# Patient Record
Sex: FEMALE | Race: WHITE | NOT HISPANIC OR LATINO | URBAN - METROPOLITAN AREA
[De-identification: names, ages, dates, MRNs, and addresses within clinical notes are randomized per-mention and may not be internally consistent; named-entity substitution may affect disease eponyms.]

---

## 2020-03-09 ENCOUNTER — OFFICE VISIT (OUTPATIENT)
Dept: ORTHOPEDICS | Facility: CLINIC | Age: 40
End: 2020-03-09
Payer: MEDICAID

## 2020-03-09 VITALS — TEMPERATURE: 98 F | HEIGHT: 62 IN | WEIGHT: 229.31 LBS | BODY MASS INDEX: 42.2 KG/M2

## 2020-03-09 DIAGNOSIS — M17.11 PRIMARY OSTEOARTHRITIS OF RIGHT KNEE: Primary | ICD-10-CM

## 2020-03-09 PROCEDURE — 20610 DRAIN/INJ JOINT/BURSA W/O US: CPT | Mod: RT,S$GLB,, | Performed by: ORTHOPAEDIC SURGERY

## 2020-03-09 PROCEDURE — 99203 PR OFFICE/OUTPT VISIT, NEW, LEVL III, 30-44 MIN: ICD-10-PCS | Mod: 25,S$GLB,, | Performed by: ORTHOPAEDIC SURGERY

## 2020-03-09 PROCEDURE — 99203 OFFICE O/P NEW LOW 30 MIN: CPT | Mod: 25,S$GLB,, | Performed by: ORTHOPAEDIC SURGERY

## 2020-03-09 PROCEDURE — 20610 LARGE JOINT ASPIRATION/INJECTION: R KNEE: ICD-10-PCS | Mod: RT,S$GLB,, | Performed by: ORTHOPAEDIC SURGERY

## 2020-03-09 RX ORDER — TRIAMCINOLONE ACETONIDE 40 MG/ML
40 INJECTION, SUSPENSION INTRA-ARTICULAR; INTRAMUSCULAR
Status: DISCONTINUED | OUTPATIENT
Start: 2020-03-09 | End: 2020-03-09 | Stop reason: HOSPADM

## 2020-03-09 RX ADMIN — TRIAMCINOLONE ACETONIDE 40 MG: 40 INJECTION, SUSPENSION INTRA-ARTICULAR; INTRAMUSCULAR at 08:03

## 2020-03-09 NOTE — PROGRESS NOTES
Subjective:      Patient ID: Giovana Hammonds is a 39 y.o. female.    Chief Complaint: Pain of the Right Knee    HPI 39-year-old lady with a several month history of right knee pain. It was initially noted that she had a valgus deformity of the knee after weight loss surgery  Review of Systems   Constitution: Negative for fever and weight loss.   Cardiovascular: Negative for chest pain and leg swelling.   Musculoskeletal: Positive for arthritis, joint pain and stiffness. Negative for joint swelling.   Gastrointestinal: Negative for change in bowel habit.   Genitourinary: Negative for bladder incontinence and hematuria.   Neurological: Negative for focal weakness, numbness, paresthesias and sensory change.         Objective:      Examination of the right knee shows valgus alignment. She has significant patellofemoral crepitus with range of motion.  She has range of motion from 0-130 degrees. There is a small effusion. She has no pathologic laxity.  She has mild tenderness with palpation of the medial and lateral femoral condyle and lateral joint line      Ortho/SPM Exam            Assessment:       Encounter Diagnosis   Name Primary?    Primary osteoarthritis of right knee Yes          Plan:       Giovana was seen today for pain.    Diagnoses and all orders for this visit:    Primary osteoarthritis of right knee     Outside x-rays are reviewed and they show complete loss of the lateral articular cartilage space with large periarticular osteophytes and subchondral cysts and sclerosis.  The knee is injected today. She is unable to take NSAIDs because of her previous weight loss surgery

## 2020-03-09 NOTE — PROCEDURES
Large Joint Aspiration/Injection: R knee  Performed by: Allan Ziegler MD  Authorized by: Allan Ziegler MD  Date/Time: 3/9/2020 8:30 AM    Consent Done?:  Yes (Verbal)  Indications:  pain  Timeout: Immediately prior to procedure a time out was called to verify the correct patient, procedure, equipment, support staff and site/side marked as required.  Prep:Patient was prepped and draped in the usual sterile fashion.        Anesthesia  Local anesthesia used  Anesthetic: lidocaine 2% without epinephrine  Anesthetic total: 2mL    Details:   Needle size: 25 G   Approach: anteromedial  Location:  Knee  Site:  R knee    Medications: 40 mg triamcinolone acetonide 40 mg/mL  Patient tolerance:  patient tolerated the procedure well with no immediate complications